# Patient Record
Sex: MALE | Race: OTHER | NOT HISPANIC OR LATINO | URBAN - METROPOLITAN AREA
[De-identification: names, ages, dates, MRNs, and addresses within clinical notes are randomized per-mention and may not be internally consistent; named-entity substitution may affect disease eponyms.]

---

## 2023-12-29 VITALS
SYSTOLIC BLOOD PRESSURE: 137 MMHG | TEMPERATURE: 98 F | RESPIRATION RATE: 16 BRPM | HEIGHT: 68 IN | WEIGHT: 130.07 LBS | HEART RATE: 66 BPM | DIASTOLIC BLOOD PRESSURE: 86 MMHG | OXYGEN SATURATION: 100 %

## 2023-12-29 RX ORDER — CHLORHEXIDINE GLUCONATE 213 G/1000ML
1 SOLUTION TOPICAL ONCE
Refills: 0 | Status: DISCONTINUED | OUTPATIENT
Start: 2024-01-05 | End: 2024-01-06

## 2023-12-29 RX ORDER — SODIUM CHLORIDE 9 MG/ML
500 INJECTION INTRAMUSCULAR; INTRAVENOUS; SUBCUTANEOUS
Refills: 0 | Status: DISCONTINUED | OUTPATIENT
Start: 2024-01-05 | End: 2024-01-06

## 2023-12-29 NOTE — H&P ADULT - MOTOR
Residual R sided weakness since stroke in 2020   4/5 RUE strength (baseline since stroke)   5/5 LUE strength (baseline)    LUE 5/5   RUE 4/5

## 2023-12-29 NOTE — H&P ADULT - HISTORY OF PRESENT ILLNESS
Cards: Dr. Murphy   Pharm:   Escort:       53 YOM, former smoker quit this year, with PMHx of CAD (MI in [place] 2020 s/p PCI of LAD that was c/b embolic CVA w/ residual weakness), HFmEF (EF 45% echo), DM, HTN, HLD, PVD who presents to outpt cardiologist Dr. Murphy c/o substernal, non radiating pressure like chest pain that is intermittent starting a few months ago. CP comes on with light physical exertion (<2 blocks or <1 flight of stairs) and alleviated with rest. Patient denies ______ SOB, SMART, palpitations, dizziness, LOC, N/V/D, fever/chills/sick contact, diaphoresis, orthopnea/PND, and leg swelling.    TTE 12/4/23: akinesis of anterior, anteroseptal, anterolateral and apical wall. LVSF mildly reduced with EF of 40-45%, grade 1 DD,   NSRT 12/4/23: large amount of infarction in anteroapical and inferoapical locations.          Cards: Dr. Murphy   Pharm:   Escort:       53 YOM, former smoker quit this year, with PMHx of CAD (MI in [place] 2020 s/p PCI of LAD that was c/b embolic CVA w/ residual weakness), HFmEF (EF 45% echo), DM, HTN, HLD, PVD who presents to outpt cardiologist Dr. Murphy c/o substernal, non radiating pressure like chest pain that is intermittent starting a few months ago. CP comes on with light physical exertion (<2 blocks or <1 flight of stairs) and alleviated with rest. Patient denies ______ SOB, SMART, palpitations, dizziness, LOC, N/V/D, fever/chills/sick contact, diaphoresis, orthopnea/PND, and leg swelling.    TTE 12/4/23: akinesis of anterior, anteroseptal, anterolateral and apical wall. LVSF mildly reduced with EF of 40-45%, grade 1 DD,   NST 12/4/23: large amount of infarction in anteroapical and inferoapical locations.          Cards: Dr. Murphy   Pharm: [facility] ([phone])   Escort: Brother + Niece       53 YOM, former smoker quit this year, with PMHx of CAD (MI in [place] 2020 s/p PCI of LAD that was c/b embolic CVA w/ residual weakness), HFmEF (EF 45% echo), DM, HTN, HLD, PVD who presents to outpt cardiologist Dr. Murphy c/o substernal, non radiating pressure like chest pain that is intermittent starting a few months ago. CP comes on with light physical exertion (<2 blocks or <1 flight of stairs) and alleviated with rest. Patient denies SOB, SMART, palpitations, dizziness, LOC, N/V/D, fever/chills/sick contact, diaphoresis, orthopnea/PND, and leg swelling.    TTE 12/4/23: akinesis of anterior, anteroseptal, anterolateral and apical wall. LVSF mildly reduced with EF of 40-45%, grade 1 DD,   NST 12/4/23: large amount of infarction in anteroapical and inferoapical locations.          Cards: Dr. Murphy   Pharm: [facility] ([phone])   Escort: Brother + Niece       53 YOM, former smoker quit this year, with PMHx of CAD (MI in [place] 2020 s/p PCI of LAD that was c/b embolic CVA w/ residual weakness), HFmEF (EF 45% echo), DM, HTN, HLD, PVD who presents to outpt cardiologist Dr. Murphy c/o substernal, non radiating pressure like chest pain that is intermittent starting a few months ago. CP comes on with light physical exertion (<2 blocks or <1 flight of stairs) and alleviated with rest. Patient denies SOB, SMART, palpitations, dizziness, LOC, N/V/D, fever/chills/sick contact, diaphoresis, orthopnea/PND, and leg swelling.    TTE 12/4/23: akinesis of anterior, anteroseptal, anterolateral and apical wall. LVSF mildly reduced with EF of 40-45%, grade 1 DD,   NST 12/4/23: large amount of infarction in anteroapical and inferoapical locations.          Cards: Dr. Murphy   Pharm: [facility] ([phone])   Escort: Brother + Niece       53 YOM, former smoker quit this year, with PMHx of CAD (MI in [place] 2020 s/p PCI of LAD that was c/b embolic CVA w/ residual weakness), HFmEF (EF 45% echo), DM, HTN, HLD, PVD who presents to outpt cardiologist Dr. Murphy c/o substernal, non radiating pressure like chest pain that is intermittent starting a few months ago. CP comes on with light physical exertion (<2 blocks or <1 flight of stairs) and alleviated with rest. Patient denies SOB, SMART, palpitations, dizziness, LOC, N/V/D, fever/chills/sick contact, diaphoresis, orthopnea/PND, and leg swelling.    TTE 12/4/23: akinesis of anterior, anteroseptal, anterolateral and apical wall. LVSF mildly reduced with EF of 40-45%, grade 1 DD,   NST 12/4/23: large amount of infarction in anteroapical and inferoapical locations.     In light of patient's risk factors, abnormal NST results, and CCS class II anginal/anginal-equivalent symptoms, patient is referred to [facility] for cardiac catheterization w/ possible intervention if clinically indicated.       Cards: Dr. Murphy   Pharm: [facility] ([phone])   Escort: Brother + Niece       53 YOM, former smoker quit this year, with PMHx of CAD (MI in [place] 2020 s/p PCI of LAD that was c/b embolic CVA w/ residual weakness), HFmEF (EF 45% echo), DM, HTN, HLD, PVD who presents to outpt cardiologist Dr. Murphy c/o substernal, non radiating pressure like chest pain that is intermittent starting a few months ago. CP comes on with light physical exertion (<2 blocks or <1 flight of stairs) and alleviated with rest. Patient denies SOB, SMART, palpitations, dizziness, LOC, N/V/D, fever/chills/sick contact, diaphoresis, orthopnea/PND, and leg swelling.    TTE 12/4/23: akinesis of anterior, anteroseptal, anterolateral and apical wall. LVSF mildly reduced with EF of 40-45%, grade 1 DD,   NST 12/4/23: large amount of infarction in anteroapical and inferoapical locations.     In light of patient's risk factors, abnormal NST results, and CCS class II anginal/anginal-equivalent symptoms, patient is referred to [facility] for cardiac catheterization w/ possible intervention if clinically indicated.

## 2023-12-29 NOTE — H&P ADULT - RESPIRATORY
normal/clear to auscultation bilaterally/no wheezes/no rales/no rhonchi/airway patent/breath sounds equal/good air movement/respiratory distress

## 2023-12-29 NOTE — H&P ADULT - NSICDXPASTMEDICALHX_GEN_ALL_CORE_FT
PAST MEDICAL HISTORY:  Acute myocardial infarction     CVA (cerebrovascular accident)     DM (diabetes mellitus)     Former tobacco use     HLD (hyperlipidemia)     HTN (hypertension)     PVD (peripheral vascular disease)

## 2023-12-29 NOTE — H&P ADULT - ASSESSMENT
-53 YOM, former smoker quit this year, with PMHx of CAD (MI in [place] 2020 s/p PCI of LAD that was c/b embolic CVA w/ residual weakness), HFmEF (EF 45% echo), DM, HTN, HLD, PVD who in light of patient's risk factors, abnormal NST results, and CCS class II anginal/anginal-equivalent symptoms, patient is referred to [facility] for cardiac catheterization w/ possible intervention if clinically indicated.    		  ASA III	Mallampati class: I    Anginal Class: II         -H/H = 15.9/47.0  .-pt denies BRBPR, hematuria, hematochezia, melena. Pt endorses compliance w/ home ASA, stating last dose was 1/4/23. Pt loaded w/ ASA 81mg x1 and Plavix 600mg x1  -BUN/Cr = 11/0.73  -EF 45%. Euvolemic on exam. IV NS @ 250cc bolus followed by 50cc/hr x 2 hrs started pre procedure    Sedation Plan:   Moderate  Patient Is Suitable Candidate For Sedation?     Yes    Risks & benefits of procedure and alternative therapy have been explained to the patient including but not limited to: allergic reaction, bleeding with possible need for blood transfusion, infection, renal and vascular compromise, limb damage, arrhythmia, stroke, vessel dissection/perforation, myocardial infarction, and emergent CABG. Informed consent obtained at bedside and included in chart. -53 YOM, former smoker quit this year, with PMHx of CAD (MI in [place] 2020 s/p PCI of LAD that was c/b embolic CVA w/ residual weakness), HFmEF (EF 45% echo), DM, HTN, HLD, PVD who in light of patient's risk factors, abnormal NST results, and CCS class II anginal/anginal-equivalent symptoms, patient is referred to [facility] for cardiac catheterization w/ possible intervention if clinically indicated.    		  ASA III	Mallampati class: I    Anginal Class: II         -H/H = 15.9/47.0  .-pt denies BRBPR, hematuria, hematochezia, melena. Pt endorses compliance w/ home ASA, stating last dose was 1/4/23. Pt loaded w/ ASA 81mg x1 and Plavix 600mg x1  -BUN/Cr = 11/0.73  -EF 45%. Euvolemic on exam. IV NS @ 250cc bolus followed by 50cc/hr x 2 hrs started pre procedure    Sedation Plan:   Moderate  Patient Is Suitable Candidate For Sedation?     Yes    Risks & benefits of procedure and alternative therapy have been explained to the patient including but not limited to: allergic reaction, bleeding with possible need for blood transfusion, infection, renal and vascular compromise, limb damage, arrhythmia, stroke, vessel dissection/perforation, myocardial infarction, and emergent CABG. Informed consent obtained at bedside and included in chart.

## 2023-12-29 NOTE — H&P ADULT - NSHPLABSRESULTS_GEN_ALL_CORE
15.9   9.82  )-----------( 303      ( 05 Jan 2024 15:46 )             47.0       01-05    140  |  103  |  11  ----------------------------<  107<H>  4.4   |  26  |  0.73    Ca    9.5      05 Jan 2024 16:39  Mg     1.9     01-05    TPro  7.8  /  Alb  4.7  /  TBili  0.6  /  DBili  x   /  AST  34  /  ALT  48<H>  /  AlkPhos  93  01-05      PT/INR - ( 05 Jan 2024 15:46 )   PT: 10.9 sec;   INR: 0.95          PTT - ( 05 Jan 2024 15:46 )  PTT:31.4 sec    CARDIAC MARKERS ( 05 Jan 2024 16:39 )  x     / x     / 120 U/L / x     / 2.9 ng/mL        Urinalysis Basic - ( 05 Jan 2024 16:39 )    Color: x / Appearance: x / SG: x / pH: x  Gluc: 107 mg/dL / Ketone: x  / Bili: x / Urobili: x   Blood: x / Protein: x / Nitrite: x   Leuk Esterase: x / RBC: x / WBC x   Sq Epi: x / Non Sq Epi: x / Bacteria: x        EKG: outpt records: 12/29/23: NSR, Q wave inferior leads,

## 2024-01-05 ENCOUNTER — INPATIENT (INPATIENT)
Facility: HOSPITAL | Age: 54
LOS: 0 days | Discharge: HOME | DRG: 322 | End: 2024-01-06
Attending: STUDENT IN AN ORGANIZED HEALTH CARE EDUCATION/TRAINING PROGRAM | Admitting: STUDENT IN AN ORGANIZED HEALTH CARE EDUCATION/TRAINING PROGRAM
Payer: MEDICAID

## 2024-01-05 DIAGNOSIS — E11.51 TYPE 2 DIABETES MELLITUS WITH DIABETIC PERIPHERAL ANGIOPATHY WITHOUT GANGRENE: ICD-10-CM

## 2024-01-05 DIAGNOSIS — I69.851 HEMIPLEGIA AND HEMIPARESIS FOLLOWING OTHER CEREBROVASCULAR DISEASE AFFECTING RIGHT DOMINANT SIDE: ICD-10-CM

## 2024-01-05 DIAGNOSIS — I69.328 OTHER SPEECH AND LANGUAGE DEFICITS FOLLOWING CEREBRAL INFARCTION: ICD-10-CM

## 2024-01-05 DIAGNOSIS — I50.9 HEART FAILURE, UNSPECIFIED: ICD-10-CM

## 2024-01-05 DIAGNOSIS — Z79.82 LONG TERM (CURRENT) USE OF ASPIRIN: ICD-10-CM

## 2024-01-05 DIAGNOSIS — I11.0 HYPERTENSIVE HEART DISEASE WITH HEART FAILURE: ICD-10-CM

## 2024-01-05 LAB
A1C WITH ESTIMATED AVERAGE GLUCOSE RESULT: 5.9 % — HIGH (ref 4–5.6)
A1C WITH ESTIMATED AVERAGE GLUCOSE RESULT: 5.9 % — HIGH (ref 4–5.6)
ALBUMIN SERPL ELPH-MCNC: 4.7 G/DL — SIGNIFICANT CHANGE UP (ref 3.3–5)
ALBUMIN SERPL ELPH-MCNC: 4.7 G/DL — SIGNIFICANT CHANGE UP (ref 3.3–5)
ALP SERPL-CCNC: 93 U/L — SIGNIFICANT CHANGE UP (ref 40–120)
ALP SERPL-CCNC: 93 U/L — SIGNIFICANT CHANGE UP (ref 40–120)
ALT FLD-CCNC: 48 U/L — HIGH (ref 10–45)
ALT FLD-CCNC: 48 U/L — HIGH (ref 10–45)
ANION GAP SERPL CALC-SCNC: 11 MMOL/L — SIGNIFICANT CHANGE UP (ref 5–17)
ANION GAP SERPL CALC-SCNC: 11 MMOL/L — SIGNIFICANT CHANGE UP (ref 5–17)
APTT BLD: 31.4 SEC — SIGNIFICANT CHANGE UP (ref 24.5–35.6)
APTT BLD: 31.4 SEC — SIGNIFICANT CHANGE UP (ref 24.5–35.6)
AST SERPL-CCNC: 34 U/L — SIGNIFICANT CHANGE UP (ref 10–40)
AST SERPL-CCNC: 34 U/L — SIGNIFICANT CHANGE UP (ref 10–40)
BASOPHILS # BLD AUTO: 0.14 K/UL — SIGNIFICANT CHANGE UP (ref 0–0.2)
BASOPHILS # BLD AUTO: 0.14 K/UL — SIGNIFICANT CHANGE UP (ref 0–0.2)
BASOPHILS NFR BLD AUTO: 1.4 % — SIGNIFICANT CHANGE UP (ref 0–2)
BASOPHILS NFR BLD AUTO: 1.4 % — SIGNIFICANT CHANGE UP (ref 0–2)
BILIRUB SERPL-MCNC: 0.6 MG/DL — SIGNIFICANT CHANGE UP (ref 0.2–1.2)
BILIRUB SERPL-MCNC: 0.6 MG/DL — SIGNIFICANT CHANGE UP (ref 0.2–1.2)
BUN SERPL-MCNC: 11 MG/DL — SIGNIFICANT CHANGE UP (ref 7–23)
BUN SERPL-MCNC: 11 MG/DL — SIGNIFICANT CHANGE UP (ref 7–23)
CALCIUM SERPL-MCNC: 9.5 MG/DL — SIGNIFICANT CHANGE UP (ref 8.4–10.5)
CALCIUM SERPL-MCNC: 9.5 MG/DL — SIGNIFICANT CHANGE UP (ref 8.4–10.5)
CHLORIDE SERPL-SCNC: 103 MMOL/L — SIGNIFICANT CHANGE UP (ref 96–108)
CHLORIDE SERPL-SCNC: 103 MMOL/L — SIGNIFICANT CHANGE UP (ref 96–108)
CHOLEST SERPL-MCNC: 149 MG/DL — SIGNIFICANT CHANGE UP
CHOLEST SERPL-MCNC: 149 MG/DL — SIGNIFICANT CHANGE UP
CK MB CFR SERPL CALC: 2.9 NG/ML — SIGNIFICANT CHANGE UP (ref 0–6.7)
CK MB CFR SERPL CALC: 2.9 NG/ML — SIGNIFICANT CHANGE UP (ref 0–6.7)
CK SERPL-CCNC: 120 U/L — SIGNIFICANT CHANGE UP (ref 30–200)
CK SERPL-CCNC: 120 U/L — SIGNIFICANT CHANGE UP (ref 30–200)
CO2 SERPL-SCNC: 26 MMOL/L — SIGNIFICANT CHANGE UP (ref 22–31)
CO2 SERPL-SCNC: 26 MMOL/L — SIGNIFICANT CHANGE UP (ref 22–31)
CREAT SERPL-MCNC: 0.73 MG/DL — SIGNIFICANT CHANGE UP (ref 0.5–1.3)
CREAT SERPL-MCNC: 0.73 MG/DL — SIGNIFICANT CHANGE UP (ref 0.5–1.3)
EGFR: 109 ML/MIN/1.73M2 — SIGNIFICANT CHANGE UP
EGFR: 109 ML/MIN/1.73M2 — SIGNIFICANT CHANGE UP
EOSINOPHIL # BLD AUTO: 0.83 K/UL — HIGH (ref 0–0.5)
EOSINOPHIL # BLD AUTO: 0.83 K/UL — HIGH (ref 0–0.5)
EOSINOPHIL NFR BLD AUTO: 8.5 % — HIGH (ref 0–6)
EOSINOPHIL NFR BLD AUTO: 8.5 % — HIGH (ref 0–6)
ESTIMATED AVERAGE GLUCOSE: 123 MG/DL — HIGH (ref 68–114)
ESTIMATED AVERAGE GLUCOSE: 123 MG/DL — HIGH (ref 68–114)
GLUCOSE BLDC GLUCOMTR-MCNC: 98 MG/DL — SIGNIFICANT CHANGE UP (ref 70–99)
GLUCOSE SERPL-MCNC: 107 MG/DL — HIGH (ref 70–99)
GLUCOSE SERPL-MCNC: 107 MG/DL — HIGH (ref 70–99)
HCT VFR BLD CALC: 47 % — SIGNIFICANT CHANGE UP (ref 39–50)
HCT VFR BLD CALC: 47 % — SIGNIFICANT CHANGE UP (ref 39–50)
HDLC SERPL-MCNC: 42 MG/DL — SIGNIFICANT CHANGE UP
HDLC SERPL-MCNC: 42 MG/DL — SIGNIFICANT CHANGE UP
HGB BLD-MCNC: 15.9 G/DL — SIGNIFICANT CHANGE UP (ref 13–17)
HGB BLD-MCNC: 15.9 G/DL — SIGNIFICANT CHANGE UP (ref 13–17)
IMM GRANULOCYTES NFR BLD AUTO: 0.2 % — SIGNIFICANT CHANGE UP (ref 0–0.9)
IMM GRANULOCYTES NFR BLD AUTO: 0.2 % — SIGNIFICANT CHANGE UP (ref 0–0.9)
INR BLD: 0.95 — SIGNIFICANT CHANGE UP (ref 0.85–1.18)
INR BLD: 0.95 — SIGNIFICANT CHANGE UP (ref 0.85–1.18)
LIPID PNL WITH DIRECT LDL SERPL: 77 MG/DL — SIGNIFICANT CHANGE UP
LIPID PNL WITH DIRECT LDL SERPL: 77 MG/DL — SIGNIFICANT CHANGE UP
LYMPHOCYTES # BLD AUTO: 3.62 K/UL — HIGH (ref 1–3.3)
LYMPHOCYTES # BLD AUTO: 3.62 K/UL — HIGH (ref 1–3.3)
LYMPHOCYTES # BLD AUTO: 36.9 % — SIGNIFICANT CHANGE UP (ref 13–44)
LYMPHOCYTES # BLD AUTO: 36.9 % — SIGNIFICANT CHANGE UP (ref 13–44)
MAGNESIUM SERPL-MCNC: 1.9 MG/DL — SIGNIFICANT CHANGE UP (ref 1.6–2.6)
MAGNESIUM SERPL-MCNC: 1.9 MG/DL — SIGNIFICANT CHANGE UP (ref 1.6–2.6)
MCHC RBC-ENTMCNC: 29.2 PG — SIGNIFICANT CHANGE UP (ref 27–34)
MCHC RBC-ENTMCNC: 29.2 PG — SIGNIFICANT CHANGE UP (ref 27–34)
MCHC RBC-ENTMCNC: 33.8 GM/DL — SIGNIFICANT CHANGE UP (ref 32–36)
MCHC RBC-ENTMCNC: 33.8 GM/DL — SIGNIFICANT CHANGE UP (ref 32–36)
MCV RBC AUTO: 86.4 FL — SIGNIFICANT CHANGE UP (ref 80–100)
MCV RBC AUTO: 86.4 FL — SIGNIFICANT CHANGE UP (ref 80–100)
MONOCYTES # BLD AUTO: 0.81 K/UL — SIGNIFICANT CHANGE UP (ref 0–0.9)
MONOCYTES # BLD AUTO: 0.81 K/UL — SIGNIFICANT CHANGE UP (ref 0–0.9)
MONOCYTES NFR BLD AUTO: 8.2 % — SIGNIFICANT CHANGE UP (ref 2–14)
MONOCYTES NFR BLD AUTO: 8.2 % — SIGNIFICANT CHANGE UP (ref 2–14)
NEUTROPHILS # BLD AUTO: 4.4 K/UL — SIGNIFICANT CHANGE UP (ref 1.8–7.4)
NEUTROPHILS # BLD AUTO: 4.4 K/UL — SIGNIFICANT CHANGE UP (ref 1.8–7.4)
NEUTROPHILS NFR BLD AUTO: 44.8 % — SIGNIFICANT CHANGE UP (ref 43–77)
NEUTROPHILS NFR BLD AUTO: 44.8 % — SIGNIFICANT CHANGE UP (ref 43–77)
NON HDL CHOLESTEROL: 107 MG/DL — SIGNIFICANT CHANGE UP
NON HDL CHOLESTEROL: 107 MG/DL — SIGNIFICANT CHANGE UP
NRBC # BLD: 0 /100 WBCS — SIGNIFICANT CHANGE UP (ref 0–0)
NRBC # BLD: 0 /100 WBCS — SIGNIFICANT CHANGE UP (ref 0–0)
PLATELET # BLD AUTO: 303 K/UL — SIGNIFICANT CHANGE UP (ref 150–400)
PLATELET # BLD AUTO: 303 K/UL — SIGNIFICANT CHANGE UP (ref 150–400)
POTASSIUM SERPL-MCNC: 4.4 MMOL/L — SIGNIFICANT CHANGE UP (ref 3.5–5.3)
POTASSIUM SERPL-MCNC: 4.4 MMOL/L — SIGNIFICANT CHANGE UP (ref 3.5–5.3)
POTASSIUM SERPL-SCNC: 4.4 MMOL/L — SIGNIFICANT CHANGE UP (ref 3.5–5.3)
POTASSIUM SERPL-SCNC: 4.4 MMOL/L — SIGNIFICANT CHANGE UP (ref 3.5–5.3)
PROT SERPL-MCNC: 7.8 G/DL — SIGNIFICANT CHANGE UP (ref 6–8.3)
PROT SERPL-MCNC: 7.8 G/DL — SIGNIFICANT CHANGE UP (ref 6–8.3)
PROTHROM AB SERPL-ACNC: 10.9 SEC — SIGNIFICANT CHANGE UP (ref 9.5–13)
PROTHROM AB SERPL-ACNC: 10.9 SEC — SIGNIFICANT CHANGE UP (ref 9.5–13)
RBC # BLD: 5.44 M/UL — SIGNIFICANT CHANGE UP (ref 4.2–5.8)
RBC # BLD: 5.44 M/UL — SIGNIFICANT CHANGE UP (ref 4.2–5.8)
RBC # FLD: 12.8 % — SIGNIFICANT CHANGE UP (ref 10.3–14.5)
RBC # FLD: 12.8 % — SIGNIFICANT CHANGE UP (ref 10.3–14.5)
SODIUM SERPL-SCNC: 140 MMOL/L — SIGNIFICANT CHANGE UP (ref 135–145)
SODIUM SERPL-SCNC: 140 MMOL/L — SIGNIFICANT CHANGE UP (ref 135–145)
TRIGL SERPL-MCNC: 149 MG/DL — SIGNIFICANT CHANGE UP
TRIGL SERPL-MCNC: 149 MG/DL — SIGNIFICANT CHANGE UP
WBC # BLD: 9.82 K/UL — SIGNIFICANT CHANGE UP (ref 3.8–10.5)
WBC # BLD: 9.82 K/UL — SIGNIFICANT CHANGE UP (ref 3.8–10.5)
WBC # FLD AUTO: 9.82 K/UL — SIGNIFICANT CHANGE UP (ref 3.8–10.5)
WBC # FLD AUTO: 9.82 K/UL — SIGNIFICANT CHANGE UP (ref 3.8–10.5)

## 2024-01-05 PROCEDURE — 93306 TTE W/DOPPLER COMPLETE: CPT | Mod: 26

## 2024-01-05 PROCEDURE — 93458 L HRT ARTERY/VENTRICLE ANGIO: CPT | Mod: 26,59

## 2024-01-05 PROCEDURE — 99152 MOD SED SAME PHYS/QHP 5/>YRS: CPT

## 2024-01-05 PROCEDURE — 92928 PRQ TCAT PLMT NTRAC ST 1 LES: CPT | Mod: LC

## 2024-01-05 RX ORDER — CLOPIDOGREL BISULFATE 75 MG/1
75 TABLET, FILM COATED ORAL DAILY
Refills: 0 | Status: DISCONTINUED | OUTPATIENT
Start: 2024-01-05 | End: 2024-01-06

## 2024-01-05 RX ORDER — ASPIRIN/CALCIUM CARB/MAGNESIUM 324 MG
81 TABLET ORAL DAILY
Refills: 0 | Status: DISCONTINUED | OUTPATIENT
Start: 2024-01-05 | End: 2024-01-06

## 2024-01-05 RX ORDER — VENLAFAXINE HCL 75 MG
75 CAPSULE, EXT RELEASE 24 HR ORAL DAILY
Refills: 0 | Status: DISCONTINUED | OUTPATIENT
Start: 2024-01-05 | End: 2024-01-06

## 2024-01-05 RX ORDER — SODIUM CHLORIDE 9 MG/ML
250 INJECTION INTRAMUSCULAR; INTRAVENOUS; SUBCUTANEOUS ONCE
Refills: 0 | Status: COMPLETED | OUTPATIENT
Start: 2024-01-05 | End: 2024-01-05

## 2024-01-05 RX ORDER — ATORVASTATIN CALCIUM 80 MG/1
40 TABLET, FILM COATED ORAL AT BEDTIME
Refills: 0 | Status: DISCONTINUED | OUTPATIENT
Start: 2024-01-05 | End: 2024-01-06

## 2024-01-05 RX ORDER — SODIUM CHLORIDE 9 MG/ML
500 INJECTION INTRAMUSCULAR; INTRAVENOUS; SUBCUTANEOUS
Refills: 0 | Status: DISCONTINUED | OUTPATIENT
Start: 2024-01-05 | End: 2024-01-06

## 2024-01-05 RX ORDER — LEVETIRACETAM 250 MG/1
1000 TABLET, FILM COATED ORAL
Refills: 0 | Status: DISCONTINUED | OUTPATIENT
Start: 2024-01-05 | End: 2024-01-06

## 2024-01-05 RX ORDER — ATORVASTATIN CALCIUM 80 MG/1
1 TABLET, FILM COATED ORAL
Refills: 0 | DISCHARGE

## 2024-01-05 RX ORDER — VENLAFAXINE HCL 75 MG
1 CAPSULE, EXT RELEASE 24 HR ORAL
Refills: 0 | DISCHARGE

## 2024-01-05 RX ORDER — LEVETIRACETAM 250 MG/1
2 TABLET, FILM COATED ORAL
Refills: 0 | DISCHARGE

## 2024-01-05 RX ORDER — ASPIRIN/CALCIUM CARB/MAGNESIUM 324 MG
81 TABLET ORAL DAILY
Refills: 0 | Status: DISCONTINUED | OUTPATIENT
Start: 2024-01-05 | End: 2024-01-05

## 2024-01-05 RX ORDER — LISINOPRIL 2.5 MG/1
20 TABLET ORAL DAILY
Refills: 0 | Status: DISCONTINUED | OUTPATIENT
Start: 2024-01-05 | End: 2024-01-06

## 2024-01-05 RX ORDER — MAGNESIUM OXIDE 400 MG ORAL TABLET 241.3 MG
400 TABLET ORAL ONCE
Refills: 0 | Status: COMPLETED | OUTPATIENT
Start: 2024-01-05 | End: 2024-01-05

## 2024-01-05 RX ORDER — CARVEDILOL PHOSPHATE 80 MG/1
6.25 CAPSULE, EXTENDED RELEASE ORAL EVERY 12 HOURS
Refills: 0 | Status: DISCONTINUED | OUTPATIENT
Start: 2024-01-05 | End: 2024-01-06

## 2024-01-05 RX ORDER — CLOPIDOGREL BISULFATE 75 MG/1
600 TABLET, FILM COATED ORAL ONCE
Refills: 0 | Status: COMPLETED | OUTPATIENT
Start: 2024-01-05 | End: 2024-01-05

## 2024-01-05 RX ADMIN — ATORVASTATIN CALCIUM 40 MILLIGRAM(S): 80 TABLET, FILM COATED ORAL at 23:26

## 2024-01-05 RX ADMIN — CARVEDILOL PHOSPHATE 6.25 MILLIGRAM(S): 80 CAPSULE, EXTENDED RELEASE ORAL at 23:26

## 2024-01-05 RX ADMIN — Medication 81 MILLIGRAM(S): at 17:32

## 2024-01-05 RX ADMIN — CLOPIDOGREL BISULFATE 600 MILLIGRAM(S): 75 TABLET, FILM COATED ORAL at 17:32

## 2024-01-05 RX ADMIN — MAGNESIUM OXIDE 400 MG ORAL TABLET 400 MILLIGRAM(S): 241.3 TABLET ORAL at 17:32

## 2024-01-05 RX ADMIN — SODIUM CHLORIDE 125 MILLILITER(S): 9 INJECTION INTRAMUSCULAR; INTRAVENOUS; SUBCUTANEOUS at 19:36

## 2024-01-05 RX ADMIN — LEVETIRACETAM 1000 MILLIGRAM(S): 250 TABLET, FILM COATED ORAL at 23:00

## 2024-01-05 RX ADMIN — SODIUM CHLORIDE 500 MILLILITER(S): 9 INJECTION INTRAMUSCULAR; INTRAVENOUS; SUBCUTANEOUS at 17:32

## 2024-01-05 NOTE — DISCHARGE NOTE PROVIDER - PROVIDER TOKENS
PROVIDER:[TOKEN:[[id number]],FOLLOWUP:[1 week],ESTABLISHEDPATIENT:[T]] PROVIDER:[TOKEN:[[id number]],FOLLOWUP:[1 week],ESTABLISHEDPATIENT:[T]]

## 2024-01-05 NOTE — PATIENT PROFILE ADULT - FALL HARM RISK - RISK INTERVENTIONS
Assistance OOB with selected safe patient handling equipment/Assistance with ambulation/Communicate Fall Risk and Risk Factors to all staff, patient, and family/Monitor gait and stability/Reinforce activity limits and safety measures with patient and family/Sit up slowly, dangle for a short time, stand at bedside before walking/Use of alarms - bed, chair and/or voice tab/Visual Cue: Yellow wristband/Bed in lowest position, wheels locked, appropriate side rails in place/Call bell, personal items and telephone in reach/Instruct patient to call for assistance before getting out of bed or chair/Non-slip footwear when patient is out of bed/[place] to call system/Physically safe environment - no spills, clutter or unnecessary equipment/Purposeful Proactive Rounding/Room/bathroom lighting operational, light cord in reach Assistance OOB with selected safe patient handling equipment/Assistance with ambulation/Communicate Fall Risk and Risk Factors to all staff, patient, and family/Monitor gait and stability/Reinforce activity limits and safety measures with patient and family/Sit up slowly, dangle for a short time, stand at bedside before walking/Use of alarms - bed, chair and/or voice tab/Visual Cue: Yellow wristband/Bed in lowest position, wheels locked, appropriate side rails in place/Call bell, personal items and telephone in reach/Instruct patient to call for assistance before getting out of bed or chair/Non-slip footwear when patient is out of bed/[place] to call system/Physically safe environment - no spills, clutter or unnecessary equipment/Purposeful Proactive Rounding/Room/bathroom lighting operational, light cord in reach

## 2024-01-05 NOTE — DISCHARGE NOTE PROVIDER - HOSPITAL COURSE
INCOMPLETE.    53-year-old male, former smoker (quit within last year), with PMHx of CAD (MI in [place] 2020 s/p PCI of LAD that was c/b embolic CVA w/ residual weakness), HFmEF (EF 45% echo), DM, HTN, HLD, PVD who was referred to [facility] for cardiac catheterization with possible intervention if clinically indicated in light of patient's risk factors, abnormal NST results, and CCS class II anginal/anginal-equivalent symptoms.    Patient is now s/p cardiac cath (1/5/23): ERICA x 1 to LPDA, o/pLAD 70-80%, prior p/mLAD stent patent, LCx ____ disease, oRCA . LVEDP 4. Access: right ulnar (due to radial occlusion).     Pt admitted overnight for observation and telemetry monitoring. Pt seen and examined at bedside this AM without any complaints or events overnight, VSS, labs and telemetry reviewed and pt stable for discharge as discussed with  ___. Pt has received appropriate discharge instructions, including medication regimen, access site management and follow up with Dr. Murphy in 1-2 weeks.    ***Patient to undergo MRI for viability prior to LAD staging***    Discharge medications: ASA 81mg QD, Plavix 75mg QD, Atorvastatin 40 mg QHS, Carvedilol 6.25 mg BID, Lisinopril 20 mg QD, Metformin-Sitagliptin 1000mg-50 mg BID    Cardiac Rehab (Post PCI): Education on benefits of Cardiac Rehab provided to patient: Yes, Referral and Prescription Given for Cardiac Rehab: Yes, Pt given list of locations & instructed to contact their insurance company to review list of participating providers. Pt discharge copies detail cardiovascular history, medication testing/treatments OR has created a patient portal account and instructed to provider their records at their 1st appointment. CVD (GLP-1 receptor agonist, SGLT2 inhibitor) meds discussed w/ patients and encouraged to discuss further with PMD or endo at next visit.   INCOMPLETE.    53-year-old male, former smoker (quit within last year), with PMHx of CAD (MI in [place] 2020 s/p PCI of LAD that was c/b embolic CVA w/ residual weakness), HFmEF (EF 45% echo), DM, HTN, HLD, PVD who was referred to [facility] for cardiac catheterization with possible intervention if clinically indicated in light of patient's risk factors, abnormal NST results, and CCS class II anginal/anginal-equivalent symptoms.    Patient is now s/p cardiac cath (1/5/23): ERICA x 1 to LPDA, o/pLAD 70-80%, prior p/mLAD stent patent, LCx ____ disease, oRCA . LVEDP 4. Access: right ulnar (due to radial occlusion).     Pt admitted overnight for observation and telemetry monitoring. Pt seen and examined at bedside this AM without any complaints or events overnight, VSS, labs and telemetry reviewed and pt stable for discharge as discussed with  ___. Pt has received appropriate discharge instructions, including medication regimen, access site management and follow up with Dr. Murphy in 1-2 weeks.    ***Patient to undergo MRI for viability prior to LAD staging***    Discharge medications: ASA 81mg QD, Plavix 75mg QD, Atorvastatin 40 mg QHS, Carvedilol 6.25 mg BID, Lisinopril 20 mg QD, Metformin-Sitagliptin 1000mg-50 mg BID    Cardiac Rehab (Post PCI): Education on benefits of Cardiac Rehab provided to patient: Yes, Referral and Prescription Given for Cardiac Rehab: Yes, Pt given list of locations & instructed to contact their insurance company to review list of participating providers. Pt discharge copies detail cardiovascular history, medication testing/treatments OR has created a patient portal account and instructed to provider their records at their 1st appointment. CVD (GLP-1 receptor agonist, SGLT2 inhibitor) meds discussed w/ patients and encouraged to discuss further with PMD or endo at next visit.   INCOMPLETE.    53-year-old male, former smoker (quit within last year), with PMHx of CAD (MI in [place] 2020 s/p PCI of LAD that was c/b embolic CVA w/ residual weakness), HFmEF (EF 45% echo), DM, HTN, HLD, PVD who was referred to [facility] for cardiac catheterization with possible intervention if clinically indicated in light of patient's risk factors, abnormal NST results, and CCS class II anginal/anginal-equivalent symptoms.    Patient is now s/p cardiac cath (1/5/23): ERICA x 1 to LPDA, o/pLAD 70-80%, prior p/mLAD stent patent, LCx ____ disease, oRCA . LVEDP 4. Access: right ulnar (due to radial occlusion).     Pt admitted overnight for observation and telemetry monitoring. Pt seen and examined at bedside this AM without any complaints or events overnight, VSS, labs and telemetry reviewed and pt stable for discharge as discussed with  ___. Pt has received appropriate discharge instructions, including medication regimen, access site management and follow up with Dr. Murphy in 1-2 weeks.    ***Patient to undergo MRI for viability prior to LAD staging***    Discharge medications: ASA 81mg QD, Plavix 75mg QD, Atorvastatin 40 mg QHS, Carvedilol 6.25 mg BID, Lisinopril 20 mg QD, Metformin-Sitagliptin 1000mg-50 mg BID    Cardiac Rehab (Post PCI): Education on benefits of Cardiac Rehab provided to patient:__________ Referral not provided as pt may be returning for staged PCI__________. Pt discharge copies detail cardiovascular history, medication testing/treatments OR has created a patient portal account and instructed to provider their records at their 1st appointment. CVD (GLP-1 receptor agonist, SGLT2 inhibitor) meds discussed w/ patients and encouraged to discuss further with PMD or endo at next visit.   INCOMPLETE.    53-year-old male, former smoker (quit within last year), with PMHx of CAD (MI in [place] 2020 s/p PCI of LAD that was c/b embolic CVA w/ residual weakness), HFmEF (EF 45% echo), DM, HTN, HLD, PVD who was referred to [facility] for cardiac catheterization with possible intervention if clinically indicated in light of patient's risk factors, abnormal NST results, and CCS class II anginal/anginal-equivalent symptoms.    Patient is now s/p cardiac cath (1/5/23): ERICA x 1 to LPDA, o/pLAD 70-80%, prior p/mLAD stent patent, LCx ____ disease, oRCA . LVEDP 4. Access: right ulnar (due to radial occlusion).     Pt admitted overnight for observation and telemetry monitoring. Pt seen and examined at bedside this AM without any complaints or events overnight, VSS, labs and telemetry reviewed and pt stable for discharge as discussed with  ___. Pt has received appropriate discharge instructions, including medication regimen, access site management and follow up with Dr. Murphy in 1-2 weeks.    ***Patient to undergo MRI for viability prior to LAD staging***    Discharge medications: ASA 81mg QD, Plavix 75mg QD, Atorvastatin 40 mg QHS, Carvedilol 6.25 mg BID, Lisinopril 20 mg QD, Metformin-Sitagliptin 1000mg-50 mg BID    Cardiac Rehab (Post PCI): Education on benefits of Cardiac Rehab provided to patient:__________ Referral not provided as pt may be returning for staged PCI__________. Pt discharge copies detail cardiovascular history, medication testing/treatments OR has created a patient portal account and instructed to provider their records at their 1st appointment. CVD (GLP-1 receptor agonist, SGLT2 inhibitor) meds discussed w/ patients and encouraged to discuss further with PMD or endo at next visit.   53-year-old male, former smoker (quit within last year), with PMHx of CAD (MI in [place] 2020 s/p PCI of LAD that was c/b embolic CVA w/  R sided residual weakness and speech impairment), HFmEF (EF 45% echo), DM, HTN, HLD, PVD who was referred to [facility] for cardiac catheterization with possible intervention if clinically indicated in light of patient's risk factors, abnormal NST results, and CCS class II anginal/anginal-equivalent symptoms.    Patient is now s/p cardiac cath (1/5/23): ERICA to LPDA (99%), o/pLAD 70-80%, prior p/mLAD stent patent, LCx-large, dominant, mid vessel 50% disease, oRCA . LVEDP 4. Access: right ulnar (due to radial occlusion).     Patient will undergo MRI for viability outpatient prior to LAD staging. This will be arranged by outpatient cardiologist. Patient will be on DAPT aspirin 81mg qd, Plavix 75mg qd. His Metformin will be resumed 2 days after the cath procedure.     Pt admitted overnight for observation and telemetry monitoring. Pt seen and examined at bedside this AM without any complaints or events overnight, VSS, labs and telemetry reviewed and pt stable for discharge as discussed with Dr. Alvarenga. Pt has received appropriate discharge instructions, including medication regimen, access site management and follow up with Dr. Murphy in 1-2 weeks.    No cardiac rehab provided as patient will likely need stage procedure in near future.     GLP-1 receptor agonist/SGLT2 inhibitor meds discussed w/ patients and encouraged to discuss further with PMD or Endocrinologist at next visit. 53-year-old male, former smoker (quit within last year), with PMHx of CAD (MI in [place] 2020 s/p PCI of LAD that was c/b embolic CVA w/  R sided residual weakness and speech impairment), HFmEF (EF 45% echo), DM, HTN, HLD, PVD who was referred to [facility] for cardiac catheterization with possible intervention if clinically indicated in light of patient's risk factors, abnormal NST results, and CCS class II anginal/anginal-equivalent symptoms.    Patient is now s/p cardiac cath (1/5/23): ERICA to LPDA (99%), o/pLAD 70-80%, prior p/mLAD stent patent, LCx-large, dominant, mid vessel 50% disease, oRCA . LVEDP 4. Access: right ulnar (due to radial occlusion).     Patient will undergo MRI for viability outpatient prior to LAD staging. This will be arranged by outpatient cardiologist. Patient will be on DAPT aspirin 81mg qd, Plavix 75mg qd. His Metformin will be resumed 2 days after the cath procedure.     Pt admitted overnight for observation and telemetry monitoring. Pt seen and examined at bedside this AM without any complaints or events overnight, VSS, labs and telemetry reviewed and pt stable for discharge as discussed with Dr. Alvarenga. Pt has received appropriate discharge instructions, including medication regimen, access site management and follow up with Dr. Murphy in 1-2 weeks.    No cardiac rehab provided as patient will likely need stage procedure in near future.     GLP-1 receptor agonist/SGLT2 inhibitor meds discussed w/ patients and encouraged to discuss further with PMD or Endocrinologist at next visit. 53-year-old male, former smoker (quit within last year), with PMHx of CAD (MI in [place] 2020 s/p PCI of LAD that was c/b embolic CVA w/  R sided residual weakness and speech impairment), HFmEF (EF 45% echo), DM, HTN, HLD, PVD who was referred to [facility] for cardiac catheterization with possible intervention if clinically indicated in light of patient's risk factors, abnormal NST results, and CCS class II anginal/anginal-equivalent symptoms.    Patient is now s/p cardiac cath (1/5/23): ERICA to LPDA (99%), o/pLAD 70-80%, prior p/mLAD stent patent, LCx-large, dominant, mid vessel 50% disease, oRCA . LVEDP 4. Access: right ulnar (due to radial occlusion).     Patient will undergo MRI for viability outpatient prior to LAD staging. This will be arranged by outpatient cardiologist. Patient will be on DAPT aspirin 81mg qd, Plavix 75mg qd. His Metformin will be resumed 2 days after the cath procedure.     He had some asymptomatic low BP during this admission with Systolic in 70-80's and received  bolus with improvement. For this reason, his BP meds dose was reduced to coreg 3.25mg BID and Lisinopril 10mg qd. He will discuss with his outpatient provider if and when he can uptitrate them outpatient.     Pt admitted overnight for observation and telemetry monitoring. Pt seen and examined at bedside this AM without any complaints or events overnight, VSS, labs and telemetry reviewed and pt stable for discharge as discussed with Dr. Alvarenga. Pt has received appropriate discharge instructions, including medication regimen, access site management and follow up with Dr. Murphy in 1-2 weeks.    No cardiac rehab provided as patient will likely need stage procedure in near future.     GLP-1 receptor agonist/SGLT2 inhibitor meds discussed w/ patients and encouraged to discuss further with PMD or Endocrinologist at next visit. 53-year-old male, former smoker (quit within last year), with PMHx of CAD (MI in [place] 2020 s/p PCI of LAD that was c/b embolic CVA w/  R sided residual weakness and speech impairment), HFmEF (EF 45% echo), DM, HTN, HLD, PVD who was referred to [facility] for cardiac catheterization with possible intervention if clinically indicated in light of patient's risk factors, abnormal NST results, and CCS class II anginal/anginal-equivalent symptoms.    Patient is now s/p cardiac cath (1/5/23): ERICA to LPDA (99%), o/pLAD 70-80%, prior p/mLAD stent patent, LCx-large, dominant, mid vessel 50% disease, oRCA . LVEDP 4. Access: right ulnar (due to radial occlusion).     Patient will undergo MRI for viability outpatient prior to LAD staging. This will be arranged by outpatient cardiologist. Patient will be on DAPT aspirin 81mg qd, Plavix 75mg qd. His Metformin will be resumed 2 days after the cath procedure.     He had some asymptomatic low BP during this admission with Systolic in 70-80's and received  bolus with improvement. For this reason, his BP meds dose was reduced to coreg 3.25mg BID and Lisinopril 10mg qd. He will discuss with his outpatient provider if and when he can uptitrate them outpatient.     Pt admitted overnight for observation and telemetry monitoring. Pt seen and examined at bedside this AM without any complaints or events overnight, VSS, labs and telemetry reviewed and pt stable for discharge as discussed with Dr. Alvarenga. Pt has received appropriate discharge instructions, including medication regimen, access site management and follow up with Dr. Murphy in 1-2 weeks.    No cardiac rehab provided as patient will likely need stage procedure in near future.     GLP-1 receptor agonist/SGLT2 inhibitor meds discussed w/ patients and encouraged to discuss further with PMD or Endocrinologist at next visit. 53-year-old male, former smoker (quit within last year), with PMHx of CAD (MI in [place] 2020 s/p PCI of LAD that was c/b embolic CVA w/  R sided residual weakness and speech impairment), HFmEF (EF 45% echo), DM, HTN, HLD, PVD who was referred to [facility] for cardiac catheterization with possible intervention if clinically indicated in light of patient's risk factors, abnormal NST results, and CCS class II anginal/anginal-equivalent symptoms.    Patient is now s/p cardiac cath (1/5/23): ERICA to LPDA (99%), o/pLAD 70-80%, prior p/mLAD stent patent, LCx-large, dominant, mid vessel 50% disease, oRCA . LVEDP 4. Access: right ulnar (due to radial occlusion).     Patient will undergo MRI for viability outpatient prior to LAD staging. This will be arranged by outpatient cardiologist. Patient will be on DAPT aspirin 81mg qd, Plavix 75mg qd. His Metformin will be resumed 2 days after the cath procedure.     He had some asymptomatic low BP during this admission with Systolic in 70-80s and received  bolus with improvement in SBP to normal range. Patient without any symptoms. For this reason, his BP meds dose was reduced to coreg 3.25mg BID and Lisinopril 10mg qd. He will discuss with his outpatient provider if and when he can uptitrate them outpatient.     Pt admitted overnight for observation and telemetry monitoring. Pt seen and examined at bedside this AM without any complaints or events overnight, VSS, labs and telemetry reviewed and pt stable for discharge as discussed with Dr. Alvarenga. Pt has received appropriate discharge instructions, including medication regimen, access site management and follow up with Dr. Murphy in 1-2 weeks.    No cardiac rehab provided as patient will likely need stage procedure in near future.     GLP-1 receptor agonist/SGLT2 inhibitor meds discussed w/ patients and encouraged to discuss further with PMD or Endocrinologist at next visit. 53-year-old male, former smoker (quit within last year), with PMHx of CAD (MI in [place] 2020 s/p PCI of LAD that was c/b embolic CVA w/  R sided residual weakness and speech impairment), HFmEF (EF 45% echo), DM, HTN, HLD, PVD who was referred to [facility] for cardiac catheterization with possible intervention if clinically indicated in light of patient's risk factors, abnormal NST results, and CCS class II anginal/anginal-equivalent symptoms.    Patient is now s/p cardiac cath (1/5/23): ERICA to LPDA (99%), o/pLAD 70-80%, prior p/mLAD stent patent, LCx-large, dominant, mid vessel 50% disease, oRCA . LVEDP 4. Access: right ulnar (due to radial occlusion).     Patient will undergo MRI for viability outpatient prior to LAD staging. This will be arranged by outpatient cardiologist. Patient will be on DAPT aspirin 81mg qd, Plavix 75mg qd. His Metformin will be resumed 2 days after the cath procedure.     He had some asymptomatic low BP during this admission with Systolic in 70-80s and received  bolus with improvement in SBP to normal range. Patient without any symptoms. For this reason, his BP meds dose was reduced to coreg 3.25mg BID and Lisinopril 10mg qd. He will discuss with his outpatient provider if and when he can uptitrate them outpatient.     Pt admitted overnight for observation and telemetry monitoring. Pt seen and examined at bedside this AM without any complaints or events overnight, VSS, labs and telemetry reviewed and pt stable for discharge as discussed with Dr. Alvarenga. Pt has received appropriate discharge instructions, including medication regimen, access site management and follow up with Dr. Murphy in 1-2 weeks.    No cardiac rehab provided as patient will likely need stage procedure in near future.     GLP-1 receptor agonist/SGLT2 inhibitor meds discussed w/ patients and encouraged to discuss further with PMD or Endocrinologist at next visit.

## 2024-01-05 NOTE — PATIENT PROFILE ADULT - NSPROHMDIABETMGMTSTRAT_GEN_A_NUR
Nothing sooner
Returned Pt call, he states he sees his CT results on My Chart and would like to discuss  He has an appt  With Dr Quinton Mcbride on 10/18/21, nothing sooner  Please call Pt as he is having pain    Thank you
Went over CT scan results which were normal and patient is requesting sooner appointment if possible
medication therapy

## 2024-01-05 NOTE — PATIENT PROFILE ADULT - FUNCTIONAL ASSESSMENT - BASIC MOBILITY 6.
3-calculated by average/Not able to assess (calculate score using [facility] averaging method)  3-calculated by average/Not able to assess (calculate score using [facility] averaging method)

## 2024-01-05 NOTE — DISCHARGE NOTE PROVIDER - CARE PROVIDER_API CALL
Derrick Murphy  Interventional Cardiology  [address] [place] [address], [address]  [place] [address]  Phone: [phone]  Established Patient  Follow Up Time: 1 week   Derrick Murphy  Interventional Cardiology  [address] [place] [address], [address]  [place] [address]  Phone: [phone]  Established Patient  Follow Up Time: 1 week

## 2024-01-05 NOTE — DISCHARGE NOTE PROVIDER - NSDCMRMEDTOKEN_GEN_ALL_CORE_FT
aspirin 81 mg oral delayed release tablet: 1 tab(s) orally once a day  atorvastatin 40 mg oral tablet: 1 tab(s) orally once a day  carvedilol 6.25 mg oral tablet: 1 tab(s) orally 2 times a day  levETIRAcetam 500 mg oral tablet, extended release: 2 tab(s) orally 2 times a day  lisinopril 20 mg oral tablet: 1 tab(s) orally once a day  metformin-sitagliptin 1000 mg-50 mg oral tablet: 1 tab(s) orally 2 times a day  venlafaxine 75 mg oral tablet: 1 tab(s) orally once a day   aspirin 81 mg oral delayed release tablet: 1 tab(s) orally once a day  atorvastatin 40 mg oral tablet: 1 tab(s) orally once a day  carvedilol 6.25 mg oral tablet: 1 tab(s) orally 2 times a day  clopidogrel 75 mg oral tablet: 1 tab(s) orally once a day  levETIRAcetam 500 mg oral tablet, extended release: 2 tab(s) orally 2 times a day  lisinopril 20 mg oral tablet: 1 tab(s) orally once a day  metformin-sitagliptin 1000 mg-50 mg oral tablet: 1 tab(s) orally 2 times a day  venlafaxine 75 mg oral tablet: 1 tab(s) orally once a day   aspirin 81 mg oral delayed release tablet: 1 tab(s) orally once a day  atorvastatin 40 mg oral tablet: 1 tab(s) orally once a day  clopidogrel 75 mg oral tablet: 1 tab(s) orally once a day  Coreg 3.125 mg oral tablet: 1 tab(s) orally 2 times a day  levETIRAcetam 500 mg oral tablet, extended release: 2 tab(s) orally 2 times a day  lisinopril 10 mg oral tablet: 1 tab(s) orally once a day  metformin-sitagliptin 1000 mg-50 mg oral tablet: 1 tab(s) orally 2 times a day  venlafaxine 75 mg oral tablet: 1 tab(s) orally once a day

## 2024-01-05 NOTE — CHART NOTE - NSCHARTNOTEFT_GEN_A_CORE
Called to patient bedside by RN for concerns of disorientation post cath. Patient unable to recite birthday in English. Patient with history of CVA with residual right sided weakness. Using [employer]  [employer] ID # [id number] patient is able to state his name and location accurately and write his birthday correctly on paper. He reports having difficulty speaking sometimes and it is sometimes easier to write things down. On neuro exam: 5/5 Strength LUE and LLE, 4/5 Strength RUE and RLE (baseline per patient), no dysarthria Called to patient bedside by RN for concerns of disorientation post cath. Patient unable to recite birthday in English. Patient with history of CVA with residual right sided weakness. Using [email or web address]  [employer] ID # [id number] patient is able to state his name and location accurately and write his birthday correctly on paper. He reports having difficulty speaking sometimes and it is sometimes easier to write things down. On neuro exam: 5/5 Strength LUE and LLE, 4/5 Strength RUE and RLE (baseline per patient), no dysarthria Called to patient bedside by RN for concerns of disorientation post cath. Patient unable to recite birthday in English. Patient with history of CVA with residual right sided weakness. Using [employer]  [employer] ID # [id number] patient is able to state his name and location accurately and write his birthday correctly on paper. He reports having difficulty speaking sometimes and it is sometimes easier to write things down. On neuro exam: 5/5 Strength LUE and LLE, 4/5 Strength RUE and RLE (baseline per patient), no dysarthria. FS 98. Continue to monitor. Called to patient bedside by RN for concerns of disorientation post cath. Patient unable to recite birthday in English. Patient with history of CVA with residual right sided weakness. Using [employer]  [employer] ID # [id number] patient is able to state his name and location accurately and write his birthday correctly on paper. He reports having difficulty speaking sometimes and it is sometimes easier to write things down. On neuro exam: 5/5 Strength LUE and LLE, 4/5 Strength RUE and RLE (baseline per patient), no dysarthria. FS 98. Continue to monitor.

## 2024-01-05 NOTE — PATIENT PROFILE ADULT - HAVE YOU RECENTLY LOST WEIGHT WITHOUT TRYING?
Patient dropped of handicapped sticker form that needs to be signed by Dr. Hong.     Placed in black folder in Dr. Dixon box.     Please advise when ready for .    No (0)

## 2024-01-05 NOTE — DISCHARGE NOTE PROVIDER - NSDCCPCAREPLAN_GEN_ALL_CORE_FT
PRINCIPAL DISCHARGE DIAGNOSIS  Diagnosis: CAD (coronary artery disease)  Assessment and Plan of Treatment: You were found to have blockages in the arteries of your heart, also known as Coronary Artery Disease. You underwent a cardiac catheterization on 1/5/24 and received a stent to the left posterior descending artery.  PLEASE CONTINUE ASPIRIN 81MG DAILY AND PLAVIX 75MG DAILY. DO NOT STOP THESE MEDICATIONS FOR ANY REASON AS THEY ARE KEEPING YOUR STENT OPEN AND PREVENTING A HEART ATTACK.   Avoid strenuous activity or heavy lifting anything more than 5lbs for the next five days. Do not take a bath or swim for the next five days; you may shower. For any bleeding or hematoma formation (hardened blood collection under the skin) at the access site of your right wrist please hold pressure and go to the emergency room. Please follow up with Dr. Murphy in 1-2 weeks. For recurrent chest pain, please call your doctor or go to the emergency room.   You have residual disease in your coronary arteries; please follow up with Dr. Murphy to schedule a cardiac MRI to determine if you should undergo another cardiac catheterization.      SECONDARY DISCHARGE DIAGNOSES  Diagnosis: HTN (hypertension)  Assessment and Plan of Treatment: Please continue Carvediolol 6.25 mg twice a day and lisinopril 20 mg once a day as listed to keep your blood pressure controlled. For blood pressure that is too high or too low please see your doctor or go to the emergency room as necessary.    Diagnosis: HLD (hyperlipidemia)  Assessment and Plan of Treatment: Please continue atorvastatin 40 mg at bedtime to keep your cholesterol low. High cholesterol contributes to heart disease.    Diagnosis: Type 2 diabetes mellitus  Assessment and Plan of Treatment: Your Hemoglobin A1c is 5.9% and your goal A1c is less than 7.0%. This number measures your average blood sugar level over the last three months.  Please RESUME metformin-sitagliptin 1000mg-50mg twice a day on 1/8/23 as listed for diabetes. Maintain a low carbohydrate, low sugar diet, exercise, monitor your fingerstick blood sugars regarly and follow up with your Endocrinologist/Primary Care Doctor.     PRINCIPAL DISCHARGE DIAGNOSIS  Diagnosis: CAD (coronary artery disease)  Assessment and Plan of Treatment: You were found to have blockages in the arteries of your heart, also known as Coronary Artery Disease. You underwent a cardiac catheterization on 1/5/24 and received_______ a stent to the left posterior descending artery_____________.  PLEASE CONTINUE ASPIRIN 81MG DAILY AND PLAVIX 75MG DAILY. DO NOT STOP THESE MEDICATIONS FOR ANY REASON AS THEY ARE KEEPING YOUR STENT OPEN AND PREVENTING A HEART ATTACK.   Avoid strenuous activity or heavy lifting anything more than 5lbs for the next five days. Do not take a bath or swim for the next five days; you may shower. For any bleeding or hematoma formation (hardened blood collection under the skin) at the access site of your right wrist please hold pressure and go to the emergency room. Please follow up with Dr. Murphy in 1-2 weeks. For recurrent chest pain, please call your doctor or go to the emergency room.   You have residual disease in your coronary arteries; please follow up with Dr. Murphy to schedule a cardiac MRI to determine if you should undergo another cardiac catheterization.      SECONDARY DISCHARGE DIAGNOSES  Diagnosis: HTN (hypertension)  Assessment and Plan of Treatment: Please continue Carvediolol 6.25 mg twice a day and lisinopril 20 mg once a day as listed to keep your blood pressure controlled. For blood pressure that is too high or too low please see your doctor or go to the emergency room as necessary.    Diagnosis: HLD (hyperlipidemia)  Assessment and Plan of Treatment: Please continue atorvastatin 40 mg at bedtime to keep your cholesterol low. High cholesterol contributes to heart disease.    Diagnosis: Type 2 diabetes mellitus  Assessment and Plan of Treatment: Your Hemoglobin A1c is 5.9% and your goal A1c is less than 7.0%. This number measures your average blood sugar level over the last three months.  Please RESUME metformin-sitagliptin 1000mg-50mg twice a day on 1/8/23 as listed for diabetes. Maintain a low carbohydrate, low sugar diet, exercise, monitor your fingerstick blood sugars regarly and follow up with your Endocrinologist/Primary Care Doctor.     PRINCIPAL DISCHARGE DIAGNOSIS  Diagnosis: CAD (coronary artery disease)  Assessment and Plan of Treatment: You were found to have blockages in the arteries of your heart, also known as Coronary Artery Disease. You underwent a cardiac catheterization on 1/5/24 and received stent to the left posterior descending artery  PLEASE CONTINUE ASPIRIN 81MG DAILY AND PLAVIX 75MG DAILY. DO NOT STOP THESE MEDICATIONS FOR ANY REASON AS THEY ARE KEEPING YOUR STENT OPEN AND PREVENTING A HEART ATTACK.   Avoid strenuous activity or heavy lifting anything more than 5lbs for the next five days. Do not take a bath or swim for the next five days; you may shower. For any bleeding or hematoma formation (hardened blood collection under the skin) at the access site of your right wrist please hold pressure and go to the emergency room. Please follow up with Dr. Murphy in 1-2 weeks. For recurrent chest pain, please call your doctor or go to the emergency room.   You have residual disease in your coronary arteries; please follow up with Dr. Murphy to schedule a cardiac MRI to determine if you should undergo another cardiac catheterization.      SECONDARY DISCHARGE DIAGNOSES  Diagnosis: HTN (hypertension)  Assessment and Plan of Treatment: Please continue Carvediolol 6.25 mg twice a day and lisinopril 20 mg once a day as listed to keep your blood pressure controlled. For blood pressure that is too high or too low please see your doctor or go to the emergency room as necessary.    Diagnosis: HLD (hyperlipidemia)  Assessment and Plan of Treatment: Please continue atorvastatin 40 mg at bedtime to keep your cholesterol low. High cholesterol contributes to heart disease.    Diagnosis: Type 2 diabetes mellitus  Assessment and Plan of Treatment: Your Hemoglobin A1c is 5.9% and your goal A1c is less than 7.0%. This number measures your average blood sugar level over the last three months.  Please RESUME metformin-sitagliptin 1000mg-50mg twice a day on 1/8/23 as listed for diabetes. Maintain a low carbohydrate, low sugar diet, exercise, monitor your fingerstick blood sugars regarly and follow up with your Endocrinologist/Primary Care Doctor.     PRINCIPAL DISCHARGE DIAGNOSIS  Diagnosis: CAD (coronary artery disease)  Assessment and Plan of Treatment: You were found to have blockages in the arteries of your heart, also known as Coronary Artery Disease. You underwent a cardiac catheterization on 1/5/24 and received stent to the left posterior descending artery  PLEASE CONTINUE ASPIRIN 81MG DAILY AND PLAVIX 75MG DAILY. DO NOT STOP THESE MEDICATIONS FOR ANY REASON AS THEY ARE KEEPING YOUR STENT OPEN AND PREVENTING A HEART ATTACK.   Avoid strenuous activity or heavy lifting anything more than 5lbs for the next five days. Do not take a bath or swim for the next five days; you may shower. For any bleeding or hematoma formation (hardened blood collection under the skin) at the access site of your right wrist please hold pressure and go to the emergency room. Please follow up with Dr. Murphy in 1-2 weeks. For recurrent chest pain, please call your doctor or go to the emergency room.   You have residual disease in your coronary arteries; please follow up with Dr. Murphy to schedule a cardiac MRI to determine if you should undergo another cardiac catheterization.      SECONDARY DISCHARGE DIAGNOSES  Diagnosis: HTN (hypertension)  Assessment and Plan of Treatment: Please decrease Carvediolol 3.125 mg twice a day and lisinopril 10 mg once a day as listed to keep your blood pressure controlled. You had some low Blood pressure readings and your BP meds doses are decreasd for this reason. Please talk to your outpatient cardiologist if and when you can increase medication doses again outpatient. For blood pressure that is too high or too low please see your doctor or go to the emergency room as necessary.    Diagnosis: HLD (hyperlipidemia)  Assessment and Plan of Treatment: Please continue atorvastatin 40 mg at bedtime to keep your cholesterol low. High cholesterol contributes to heart disease.    Diagnosis: Type 2 diabetes mellitus  Assessment and Plan of Treatment: Your Hemoglobin A1c is 5.9% and your goal A1c is less than 7.0%. This number measures your average blood sugar level over the last three months.  Please RESUME metformin-sitagliptin 1000mg-50mg twice a day on 1/8/23 as listed for diabetes. Maintain a low carbohydrate, low sugar diet, exercise, monitor your fingerstick blood sugars regarly and follow up with your Endocrinologist/Primary Care Doctor.

## 2024-01-06 VITALS
OXYGEN SATURATION: 97 % | DIASTOLIC BLOOD PRESSURE: 59 MMHG | HEART RATE: 60 BPM | RESPIRATION RATE: 18 BRPM | SYSTOLIC BLOOD PRESSURE: 99 MMHG | TEMPERATURE: 98 F

## 2024-01-06 LAB
A1C WITH ESTIMATED AVERAGE GLUCOSE RESULT: 5.9 % — HIGH (ref 4–5.6)
A1C WITH ESTIMATED AVERAGE GLUCOSE RESULT: 5.9 % — HIGH (ref 4–5.6)
ALBUMIN SERPL ELPH-MCNC: 4.1 G/DL — SIGNIFICANT CHANGE UP (ref 3.3–5)
ALBUMIN SERPL ELPH-MCNC: 4.1 G/DL — SIGNIFICANT CHANGE UP (ref 3.3–5)
ALP SERPL-CCNC: 80 U/L — SIGNIFICANT CHANGE UP (ref 40–120)
ALP SERPL-CCNC: 80 U/L — SIGNIFICANT CHANGE UP (ref 40–120)
ALT FLD-CCNC: 36 U/L — SIGNIFICANT CHANGE UP (ref 10–45)
ALT FLD-CCNC: 36 U/L — SIGNIFICANT CHANGE UP (ref 10–45)
ANION GAP SERPL CALC-SCNC: 9 MMOL/L — SIGNIFICANT CHANGE UP (ref 5–17)
ANION GAP SERPL CALC-SCNC: 9 MMOL/L — SIGNIFICANT CHANGE UP (ref 5–17)
AST SERPL-CCNC: 30 U/L — SIGNIFICANT CHANGE UP (ref 10–40)
AST SERPL-CCNC: 30 U/L — SIGNIFICANT CHANGE UP (ref 10–40)
BASOPHILS # BLD AUTO: 0.09 K/UL — SIGNIFICANT CHANGE UP (ref 0–0.2)
BASOPHILS # BLD AUTO: 0.09 K/UL — SIGNIFICANT CHANGE UP (ref 0–0.2)
BASOPHILS NFR BLD AUTO: 0.9 % — SIGNIFICANT CHANGE UP (ref 0–2)
BASOPHILS NFR BLD AUTO: 0.9 % — SIGNIFICANT CHANGE UP (ref 0–2)
BILIRUB SERPL-MCNC: 0.6 MG/DL — SIGNIFICANT CHANGE UP (ref 0.2–1.2)
BILIRUB SERPL-MCNC: 0.6 MG/DL — SIGNIFICANT CHANGE UP (ref 0.2–1.2)
BUN SERPL-MCNC: 15 MG/DL — SIGNIFICANT CHANGE UP (ref 7–23)
BUN SERPL-MCNC: 15 MG/DL — SIGNIFICANT CHANGE UP (ref 7–23)
CALCIUM SERPL-MCNC: 8.7 MG/DL — SIGNIFICANT CHANGE UP (ref 8.4–10.5)
CALCIUM SERPL-MCNC: 8.7 MG/DL — SIGNIFICANT CHANGE UP (ref 8.4–10.5)
CHLORIDE SERPL-SCNC: 106 MMOL/L — SIGNIFICANT CHANGE UP (ref 96–108)
CHLORIDE SERPL-SCNC: 106 MMOL/L — SIGNIFICANT CHANGE UP (ref 96–108)
CHOLEST SERPL-MCNC: 128 MG/DL — SIGNIFICANT CHANGE UP
CHOLEST SERPL-MCNC: 128 MG/DL — SIGNIFICANT CHANGE UP
CO2 SERPL-SCNC: 25 MMOL/L — SIGNIFICANT CHANGE UP (ref 22–31)
CO2 SERPL-SCNC: 25 MMOL/L — SIGNIFICANT CHANGE UP (ref 22–31)
CREAT SERPL-MCNC: 0.8 MG/DL — SIGNIFICANT CHANGE UP (ref 0.5–1.3)
CREAT SERPL-MCNC: 0.8 MG/DL — SIGNIFICANT CHANGE UP (ref 0.5–1.3)
EGFR: 106 ML/MIN/1.73M2 — SIGNIFICANT CHANGE UP
EGFR: 106 ML/MIN/1.73M2 — SIGNIFICANT CHANGE UP
EOSINOPHIL # BLD AUTO: 0.67 K/UL — HIGH (ref 0–0.5)
EOSINOPHIL # BLD AUTO: 0.67 K/UL — HIGH (ref 0–0.5)
EOSINOPHIL NFR BLD AUTO: 6.4 % — HIGH (ref 0–6)
EOSINOPHIL NFR BLD AUTO: 6.4 % — HIGH (ref 0–6)
ESTIMATED AVERAGE GLUCOSE: 123 MG/DL — HIGH (ref 68–114)
ESTIMATED AVERAGE GLUCOSE: 123 MG/DL — HIGH (ref 68–114)
GLUCOSE BLDC GLUCOMTR-MCNC: 101 MG/DL — HIGH (ref 70–99)
GLUCOSE BLDC GLUCOMTR-MCNC: 101 MG/DL — HIGH (ref 70–99)
GLUCOSE BLDC GLUCOMTR-MCNC: 126 MG/DL — HIGH (ref 70–99)
GLUCOSE BLDC GLUCOMTR-MCNC: 126 MG/DL — HIGH (ref 70–99)
GLUCOSE SERPL-MCNC: 99 MG/DL — SIGNIFICANT CHANGE UP (ref 70–99)
GLUCOSE SERPL-MCNC: 99 MG/DL — SIGNIFICANT CHANGE UP (ref 70–99)
HCT VFR BLD CALC: 42 % — SIGNIFICANT CHANGE UP (ref 39–50)
HCT VFR BLD CALC: 42 % — SIGNIFICANT CHANGE UP (ref 39–50)
HDLC SERPL-MCNC: 31 MG/DL — LOW
HDLC SERPL-MCNC: 31 MG/DL — LOW
HGB BLD-MCNC: 14 G/DL — SIGNIFICANT CHANGE UP (ref 13–17)
HGB BLD-MCNC: 14 G/DL — SIGNIFICANT CHANGE UP (ref 13–17)
IMM GRANULOCYTES NFR BLD AUTO: 0.2 % — SIGNIFICANT CHANGE UP (ref 0–0.9)
IMM GRANULOCYTES NFR BLD AUTO: 0.2 % — SIGNIFICANT CHANGE UP (ref 0–0.9)
LIPID PNL WITH DIRECT LDL SERPL: 57 MG/DL — SIGNIFICANT CHANGE UP
LIPID PNL WITH DIRECT LDL SERPL: 57 MG/DL — SIGNIFICANT CHANGE UP
LYMPHOCYTES # BLD AUTO: 3.28 K/UL — SIGNIFICANT CHANGE UP (ref 1–3.3)
LYMPHOCYTES # BLD AUTO: 3.28 K/UL — SIGNIFICANT CHANGE UP (ref 1–3.3)
LYMPHOCYTES # BLD AUTO: 31.5 % — SIGNIFICANT CHANGE UP (ref 13–44)
LYMPHOCYTES # BLD AUTO: 31.5 % — SIGNIFICANT CHANGE UP (ref 13–44)
MAGNESIUM SERPL-MCNC: 2.1 MG/DL — SIGNIFICANT CHANGE UP (ref 1.6–2.6)
MAGNESIUM SERPL-MCNC: 2.1 MG/DL — SIGNIFICANT CHANGE UP (ref 1.6–2.6)
MCHC RBC-ENTMCNC: 29 PG — SIGNIFICANT CHANGE UP (ref 27–34)
MCHC RBC-ENTMCNC: 29 PG — SIGNIFICANT CHANGE UP (ref 27–34)
MCHC RBC-ENTMCNC: 33.3 GM/DL — SIGNIFICANT CHANGE UP (ref 32–36)
MCHC RBC-ENTMCNC: 33.3 GM/DL — SIGNIFICANT CHANGE UP (ref 32–36)
MCV RBC AUTO: 87.1 FL — SIGNIFICANT CHANGE UP (ref 80–100)
MCV RBC AUTO: 87.1 FL — SIGNIFICANT CHANGE UP (ref 80–100)
MONOCYTES # BLD AUTO: 0.79 K/UL — SIGNIFICANT CHANGE UP (ref 0–0.9)
MONOCYTES # BLD AUTO: 0.79 K/UL — SIGNIFICANT CHANGE UP (ref 0–0.9)
MONOCYTES NFR BLD AUTO: 7.6 % — SIGNIFICANT CHANGE UP (ref 2–14)
MONOCYTES NFR BLD AUTO: 7.6 % — SIGNIFICANT CHANGE UP (ref 2–14)
NEUTROPHILS # BLD AUTO: 5.55 K/UL — SIGNIFICANT CHANGE UP (ref 1.8–7.4)
NEUTROPHILS # BLD AUTO: 5.55 K/UL — SIGNIFICANT CHANGE UP (ref 1.8–7.4)
NEUTROPHILS NFR BLD AUTO: 53.4 % — SIGNIFICANT CHANGE UP (ref 43–77)
NEUTROPHILS NFR BLD AUTO: 53.4 % — SIGNIFICANT CHANGE UP (ref 43–77)
NON HDL CHOLESTEROL: 97 MG/DL — SIGNIFICANT CHANGE UP
NON HDL CHOLESTEROL: 97 MG/DL — SIGNIFICANT CHANGE UP
NRBC # BLD: 0 /100 WBCS — SIGNIFICANT CHANGE UP (ref 0–0)
NRBC # BLD: 0 /100 WBCS — SIGNIFICANT CHANGE UP (ref 0–0)
PLATELET # BLD AUTO: 259 K/UL — SIGNIFICANT CHANGE UP (ref 150–400)
PLATELET # BLD AUTO: 259 K/UL — SIGNIFICANT CHANGE UP (ref 150–400)
POTASSIUM SERPL-MCNC: 4.1 MMOL/L — SIGNIFICANT CHANGE UP (ref 3.5–5.3)
POTASSIUM SERPL-MCNC: 4.1 MMOL/L — SIGNIFICANT CHANGE UP (ref 3.5–5.3)
POTASSIUM SERPL-SCNC: 4.1 MMOL/L — SIGNIFICANT CHANGE UP (ref 3.5–5.3)
POTASSIUM SERPL-SCNC: 4.1 MMOL/L — SIGNIFICANT CHANGE UP (ref 3.5–5.3)
PROT SERPL-MCNC: 6.4 G/DL — SIGNIFICANT CHANGE UP (ref 6–8.3)
PROT SERPL-MCNC: 6.4 G/DL — SIGNIFICANT CHANGE UP (ref 6–8.3)
RBC # BLD: 4.82 M/UL — SIGNIFICANT CHANGE UP (ref 4.2–5.8)
RBC # BLD: 4.82 M/UL — SIGNIFICANT CHANGE UP (ref 4.2–5.8)
RBC # FLD: 12.9 % — SIGNIFICANT CHANGE UP (ref 10.3–14.5)
RBC # FLD: 12.9 % — SIGNIFICANT CHANGE UP (ref 10.3–14.5)
SODIUM SERPL-SCNC: 140 MMOL/L — SIGNIFICANT CHANGE UP (ref 135–145)
SODIUM SERPL-SCNC: 140 MMOL/L — SIGNIFICANT CHANGE UP (ref 135–145)
TRIGL SERPL-MCNC: 199 MG/DL — HIGH
TRIGL SERPL-MCNC: 199 MG/DL — HIGH
WBC # BLD: 10.4 K/UL — SIGNIFICANT CHANGE UP (ref 3.8–10.5)
WBC # BLD: 10.4 K/UL — SIGNIFICANT CHANGE UP (ref 3.8–10.5)
WBC # FLD AUTO: 10.4 K/UL — SIGNIFICANT CHANGE UP (ref 3.8–10.5)
WBC # FLD AUTO: 10.4 K/UL — SIGNIFICANT CHANGE UP (ref 3.8–10.5)

## 2024-01-06 PROCEDURE — 99239 HOSP IP/OBS DSCHRG MGMT >30: CPT

## 2024-01-06 RX ORDER — CARVEDILOL PHOSPHATE 80 MG/1
1 CAPSULE, EXTENDED RELEASE ORAL
Qty: 30 | Refills: 2
Start: 2024-01-06 | End: 2024-04-04

## 2024-01-06 RX ORDER — LISINOPRIL 2.5 MG/1
1 TABLET ORAL
Qty: 30 | Refills: 2
Start: 2024-01-06 | End: 2024-04-04

## 2024-01-06 RX ORDER — LISINOPRIL 2.5 MG/1
1 TABLET ORAL
Refills: 0 | DISCHARGE

## 2024-01-06 RX ORDER — CLOPIDOGREL BISULFATE 75 MG/1
1 TABLET, FILM COATED ORAL
Qty: 30 | Refills: 11
Start: 2024-01-06 | End: 2024-12-30

## 2024-01-06 RX ORDER — CARVEDILOL PHOSPHATE 80 MG/1
1 CAPSULE, EXTENDED RELEASE ORAL
Qty: 60 | Refills: 2
Start: 2024-01-06 | End: 2024-04-04

## 2024-01-06 RX ORDER — SODIUM CHLORIDE 9 MG/ML
250 INJECTION INTRAMUSCULAR; INTRAVENOUS; SUBCUTANEOUS ONCE
Refills: 0 | Status: COMPLETED | OUTPATIENT
Start: 2024-01-06 | End: 2024-01-06

## 2024-01-06 RX ORDER — CARVEDILOL PHOSPHATE 80 MG/1
1 CAPSULE, EXTENDED RELEASE ORAL
Refills: 0 | DISCHARGE

## 2024-01-06 RX ORDER — ASPIRIN/CALCIUM CARB/MAGNESIUM 324 MG
1 TABLET ORAL
Refills: 0 | DISCHARGE

## 2024-01-06 RX ORDER — ASPIRIN/CALCIUM CARB/MAGNESIUM 324 MG
1 TABLET ORAL
Qty: 30 | Refills: 11
Start: 2024-01-06 | End: 2024-12-30

## 2024-01-06 RX ADMIN — LISINOPRIL 20 MILLIGRAM(S): 2.5 TABLET ORAL at 06:46

## 2024-01-06 RX ADMIN — Medication 81 MILLIGRAM(S): at 12:09

## 2024-01-06 RX ADMIN — LEVETIRACETAM 1000 MILLIGRAM(S): 250 TABLET, FILM COATED ORAL at 06:46

## 2024-01-06 RX ADMIN — SODIUM CHLORIDE 500 MILLILITER(S): 9 INJECTION INTRAMUSCULAR; INTRAVENOUS; SUBCUTANEOUS at 12:08

## 2024-01-06 RX ADMIN — Medication 75 MILLIGRAM(S): at 12:09

## 2024-01-06 RX ADMIN — CLOPIDOGREL BISULFATE 75 MILLIGRAM(S): 75 TABLET, FILM COATED ORAL at 12:09

## 2024-01-06 NOTE — DISCHARGE NOTE NURSING/CASE MANAGEMENT/SOCIAL WORK - PATIENT PORTAL LINK FT
You can access the FollowMyHealth Patient Portal offered by [facility] by registering at the following website: [email or web address]. By joining [email or web address]’s FollowMyHealth portal, you will also be able to view your health information using other applications (apps) compatible with our system. You can access the FollowMyHealth Patient Portal offered by [facility] by registering at the following website: [email or web address]. By joining [email or web address]’s FollowMyHealth portal, you will also be able to view your health information using other applications (apps) compatible with our system.

## 2024-01-06 NOTE — DISCHARGE NOTE NURSING/CASE MANAGEMENT/SOCIAL WORK - NSDCPEFALRISK_GEN_ALL_CORE
For information on Fall & Injury Prevention, visit: [email or web address] OR  [email or web address] OR  https://www.cdc.gov/steadi/patient.html For information on Fall & Injury Prevention, visit: [email or web address] OR  [email or web address] OR  https://www.cdc.gov/steadi/patient.html

## 2024-01-08 RX ORDER — SITAGLIPTIN AND METFORMIN HYDROCHLORIDE 500; 50 MG/1; MG/1
1 TABLET, FILM COATED ORAL
Qty: 0 | Refills: 0 | DISCHARGE
Start: 2024-01-08

## 2024-01-11 DIAGNOSIS — Z87.891 PERSONAL HISTORY OF NICOTINE DEPENDENCE: ICD-10-CM

## 2024-01-11 DIAGNOSIS — Z95.5 PRESENCE OF CORONARY ANGIOPLASTY IMPLANT AND GRAFT: ICD-10-CM

## 2024-01-11 DIAGNOSIS — I25.118 ATHEROSCLEROTIC HEART DISEASE OF NATIVE CORONARY ARTERY WITH OTHER FORMS OF ANGINA PECTORIS: ICD-10-CM

## 2024-01-11 DIAGNOSIS — I10 ESSENTIAL (PRIMARY) HYPERTENSION: ICD-10-CM

## 2024-01-11 DIAGNOSIS — I25.2 OLD MYOCARDIAL INFARCTION: ICD-10-CM

## 2024-01-11 DIAGNOSIS — E11.9 TYPE 2 DIABETES MELLITUS WITHOUT COMPLICATIONS: ICD-10-CM

## 2024-01-11 DIAGNOSIS — Z79.84 LONG TERM (CURRENT) USE OF ORAL HYPOGLYCEMIC DRUGS: ICD-10-CM

## 2024-01-11 DIAGNOSIS — Z86.73 PERSONAL HISTORY OF TRANSIENT ISCHEMIC ATTACK (TIA), AND CEREBRAL INFARCTION WITHOUT RESIDUAL DEFICITS: ICD-10-CM

## 2024-01-11 DIAGNOSIS — E78.5 HYPERLIPIDEMIA, UNSPECIFIED: ICD-10-CM

## 2024-01-12 LAB
ISTAT ACTK (ACTIVATED CLOTTING TIME KAOLIN): 277 SEC — HIGH (ref 74–137)
ISTAT ACTK (ACTIVATED CLOTTING TIME KAOLIN): 277 SEC — HIGH (ref 74–137)

## 2024-04-04 NOTE — PATIENT PROFILE ADULT - PRO INTERPRETER NEED 2
English Detail Level: Detailed Additional Notes: UPT performed today test was negative \\nLot#[id number]\\nExp 10/08/2024\\nTest performed by Shelia PAREDES Render Risk Assessment In Note?: no

## 2024-08-22 PROCEDURE — 80053 COMPREHEN METABOLIC PANEL: CPT

## 2024-08-22 PROCEDURE — C8929: CPT

## 2024-08-22 PROCEDURE — 80061 LIPID PANEL: CPT

## 2024-08-22 PROCEDURE — 85025 COMPLETE CBC W/AUTO DIFF WBC: CPT

## 2024-08-22 PROCEDURE — C1769: CPT

## 2024-08-22 PROCEDURE — C1874: CPT

## 2024-08-22 PROCEDURE — 82553 CREATINE MB FRACTION: CPT

## 2024-08-22 PROCEDURE — 36415 COLL VENOUS BLD VENIPUNCTURE: CPT

## 2024-08-22 PROCEDURE — 83735 ASSAY OF MAGNESIUM: CPT

## 2024-08-22 PROCEDURE — 85347 COAGULATION TIME ACTIVATED: CPT

## 2024-08-22 PROCEDURE — C1887: CPT

## 2024-08-22 PROCEDURE — C1894: CPT

## 2024-08-22 PROCEDURE — 82962 GLUCOSE BLOOD TEST: CPT

## 2024-08-22 PROCEDURE — C1725: CPT

## 2024-08-22 PROCEDURE — 85610 PROTHROMBIN TIME: CPT

## 2024-08-22 PROCEDURE — 85730 THROMBOPLASTIN TIME PARTIAL: CPT

## 2024-08-22 PROCEDURE — 83036 HEMOGLOBIN GLYCOSYLATED A1C: CPT

## 2024-08-22 PROCEDURE — 82550 ASSAY OF CK (CPK): CPT
